# Patient Record
Sex: FEMALE | Race: WHITE | NOT HISPANIC OR LATINO | Employment: STUDENT | URBAN - METROPOLITAN AREA
[De-identification: names, ages, dates, MRNs, and addresses within clinical notes are randomized per-mention and may not be internally consistent; named-entity substitution may affect disease eponyms.]

---

## 2018-06-22 ENCOUNTER — RECORDS - HEALTHEAST (OUTPATIENT)
Dept: LAB | Facility: CLINIC | Age: 18
End: 2018-06-22

## 2018-06-22 LAB
TSH SERPL DL<=0.005 MIU/L-ACNC: 0.95 UIU/ML (ref 0.3–5)
VIT B12 SERPL-MCNC: 558 PG/ML (ref 213–816)

## 2019-03-11 ENCOUNTER — RECORDS - HEALTHEAST (OUTPATIENT)
Dept: LAB | Facility: CLINIC | Age: 19
End: 2019-03-11

## 2019-03-11 LAB
ALBUMIN SERPL-MCNC: 4.4 G/DL (ref 3.5–5)
ALP SERPL-CCNC: 62 U/L (ref 45–120)
ALT SERPL W P-5'-P-CCNC: 18 U/L (ref 0–45)
ANION GAP SERPL CALCULATED.3IONS-SCNC: 13 MMOL/L (ref 5–18)
AST SERPL W P-5'-P-CCNC: 20 U/L (ref 0–40)
BILIRUB SERPL-MCNC: 0.4 MG/DL (ref 0–1)
BUN SERPL-MCNC: 11 MG/DL (ref 8–22)
C REACTIVE PROTEIN LHE: <0.1 MG/DL (ref 0–0.8)
CALCIUM SERPL-MCNC: 10 MG/DL (ref 8.5–10.5)
CHLORIDE BLD-SCNC: 106 MMOL/L (ref 98–107)
CO2 SERPL-SCNC: 21 MMOL/L (ref 22–31)
CREAT SERPL-MCNC: 0.82 MG/DL (ref 0.6–1.1)
ERYTHROCYTE [SEDIMENTATION RATE] IN BLOOD BY WESTERGREN METHOD: 5 MM/HR (ref 0–20)
GFR SERPL CREATININE-BSD FRML MDRD: >60 ML/MIN/1.73M2
GLUCOSE BLD-MCNC: 89 MG/DL (ref 70–125)
IRON SERPL-MCNC: 73 UG/DL (ref 42–175)
POTASSIUM BLD-SCNC: 4.2 MMOL/L (ref 3.5–5)
PROT SERPL-MCNC: 7.3 G/DL (ref 6–8)
SODIUM SERPL-SCNC: 140 MMOL/L (ref 136–145)
TSH SERPL DL<=0.005 MIU/L-ACNC: 0.83 UIU/ML (ref 0.3–5)
VIT B12 SERPL-MCNC: 607 PG/ML (ref 213–816)

## 2019-03-12 ENCOUNTER — RECORDS - HEALTHEAST (OUTPATIENT)
Dept: LAB | Facility: CLINIC | Age: 19
End: 2019-03-12

## 2019-03-13 LAB
O+P STL MICRO: NORMAL
SHIGA TOXIN 1: NEGATIVE
SHIGA TOXIN 2: NEGATIVE

## 2019-03-14 LAB
GLIADIN IGA SER-ACNC: 0.4 U/ML
GLIADIN IGG SER-ACNC: 1 U/ML
HEMOCCULT STL-MCNT: 0.3 MG HB/G
IGA SERPL-MCNC: 111 MG/DL (ref 65–400)
TTG IGA SER-ACNC: 0.3 U/ML
TTG IGG SER-ACNC: <0.6 U/ML

## 2019-03-15 LAB — BACTERIA SPEC CULT: NORMAL

## 2019-07-11 ENCOUNTER — RECORDS - HEALTHEAST (OUTPATIENT)
Dept: LAB | Facility: CLINIC | Age: 19
End: 2019-07-11

## 2019-07-15 LAB
GLIADIN IGA SER-ACNC: 0.6 U/ML
GLIADIN IGG SER-ACNC: 1 U/ML
IGA SERPL-MCNC: 107 MG/DL (ref 65–400)
TTG IGA SER-ACNC: 0.6 U/ML
TTG IGG SER-ACNC: 3.6 U/ML

## 2020-12-21 ENCOUNTER — OFFICE VISIT - HEALTHEAST (OUTPATIENT)
Dept: FAMILY MEDICINE | Facility: CLINIC | Age: 20
End: 2020-12-21

## 2020-12-21 DIAGNOSIS — H60.502 ACUTE OTITIS EXTERNA OF LEFT EAR, UNSPECIFIED TYPE: ICD-10-CM

## 2021-06-05 VITALS
WEIGHT: 162 LBS | HEART RATE: 44 BPM | OXYGEN SATURATION: 98 % | DIASTOLIC BLOOD PRESSURE: 72 MMHG | TEMPERATURE: 98.7 F | SYSTOLIC BLOOD PRESSURE: 111 MMHG

## 2021-06-13 NOTE — PROGRESS NOTES
Assessment & Plan:       1. Acute otitis externa of left ear, unspecified type  neomycin-polymyxin-hydrocortisone (CORTISPORIN) otic solution      Medical Decision Making  Patient presents with acute onset left ear pain.  Physical exam shows signs consistent with acute otitis externa of the left ear.  Swelling appears mild to moderate and patient has no history of swimming exposure.  Will treat with Cortisporin otic solution.  Discussed proper ear care, and avoidance of cotton tip swabs.  Discussed signs of worsening symptoms and when to follow-up with PCP if no symptom improvement.    Patient Instructions   You were seen today for an infection of the outer ear, also called otitis externa.    Treatment:  - Use antibiotic drops as prescribed until completion, even if symptoms improve  - May use tylenol or ibuprofen for pain and discomfort  - Should notice symptom improvement in the next 72 hours  - Avoid swimming or getting fluid in the infected ear until 24 hours after symptoms resolve    When to return for re-evaluation?  - If symptoms have not begun improving in 72 hours  - Develop a fever of 100.4F or current fever worsens  - Becomes short of breath  - Neck stiffness  - Difficulty swallowing   - Worsening ear pain despite treatment  - Spreading redness, swelling, and tenderness          Subjective:       Brenna Jimenez is a 19 y.o. female here for evaluation of left ear pain.  Onset of symptoms was 3 to 4 days ago.  Patient denies fevers, sore throat, cough, ear drainage, and recent swimming exposure.  She does not have history of frequent ear infections.  Patient does use cotton-tipped swabs for ear cleaning on a daily basis.    The following portions of the patient's history were reviewed and updated as appropriate: allergies, current medications and problem list.    Review of Systems  Pertinent items are noted in HPI.     Allergies  No Known Allergies    No family history on file.    Social History      Socioeconomic History     Marital status: Single     Spouse name: None     Number of children: None     Years of education: None     Highest education level: None   Occupational History     None   Social Needs     Financial resource strain: None     Food insecurity     Worry: None     Inability: None     Transportation needs     Medical: None     Non-medical: None   Tobacco Use     Smoking status: Never Smoker     Smokeless tobacco: Never Used   Substance and Sexual Activity     Alcohol use: None     Drug use: None     Sexual activity: None   Lifestyle     Physical activity     Days per week: None     Minutes per session: None     Stress: None   Relationships     Social connections     Talks on phone: None     Gets together: None     Attends Hindu service: None     Active member of club or organization: None     Attends meetings of clubs or organizations: None     Relationship status: None     Intimate partner violence     Fear of current or ex partner: None     Emotionally abused: None     Physically abused: None     Forced sexual activity: None   Other Topics Concern     None   Social History Narrative     None         Objective:       /72   Pulse (!) 44   Temp 98.7  F (37.1  C)   Wt 162 lb (73.5 kg)   SpO2 98%   General appearance: alert, appears stated age, cooperative, no distress and non-toxic  Head: Normocephalic, without obvious abnormality, atraumatic  Ears: Left: TM intact with moderate serous fluid, no bulging; external ear canal with erythema and mild swelling, tenderness to tragus and auricle manipulation.  Right: TM intact with moderate serous fluid, no bulging or erythema, external ear normal  Throat: lips, mucosa, and tongue normal; teeth and gums normal  Neck: mild anterior cervical adenopathy and supple, symmetrical, trachea midline

## 2022-06-07 ENCOUNTER — OFFICE VISIT (OUTPATIENT)
Dept: FAMILY MEDICINE | Facility: CLINIC | Age: 22
End: 2022-06-07
Payer: COMMERCIAL

## 2022-06-07 VITALS
SYSTOLIC BLOOD PRESSURE: 110 MMHG | HEART RATE: 62 BPM | WEIGHT: 170 LBS | BODY MASS INDEX: 25.18 KG/M2 | DIASTOLIC BLOOD PRESSURE: 70 MMHG | OXYGEN SATURATION: 100 % | HEIGHT: 69 IN

## 2022-06-07 DIAGNOSIS — Z23 ENCOUNTER FOR IMMUNIZATION: ICD-10-CM

## 2022-06-07 DIAGNOSIS — Z11.1 SCREENING EXAMINATION FOR PULMONARY TUBERCULOSIS: ICD-10-CM

## 2022-06-07 DIAGNOSIS — Z00.00 ROUTINE PHYSICAL EXAMINATION: Primary | ICD-10-CM

## 2022-06-07 PROCEDURE — 86580 TB INTRADERMAL TEST: CPT | Performed by: FAMILY MEDICINE

## 2022-06-07 PROCEDURE — 99395 PREV VISIT EST AGE 18-39: CPT | Mod: 25 | Performed by: FAMILY MEDICINE

## 2022-06-07 PROCEDURE — 90471 IMMUNIZATION ADMIN: CPT | Performed by: FAMILY MEDICINE

## 2022-06-07 PROCEDURE — 90715 TDAP VACCINE 7 YRS/> IM: CPT | Performed by: FAMILY MEDICINE

## 2022-06-07 ASSESSMENT — ENCOUNTER SYMPTOMS
HEADACHES: 0
FEVER: 0
EYE PAIN: 0
PALPITATIONS: 0
HEMATOCHEZIA: 0
SHORTNESS OF BREATH: 0
COUGH: 0
FREQUENCY: 0
JOINT SWELLING: 0
ABDOMINAL PAIN: 0
PARESTHESIAS: 0
NERVOUS/ANXIOUS: 0
DIARRHEA: 0
WEAKNESS: 0
NAUSEA: 0
MYALGIAS: 0
ARTHRALGIAS: 0
DYSURIA: 0
CHILLS: 0
CONSTIPATION: 0
SORE THROAT: 0
HEARTBURN: 0
DIZZINESS: 0
HEMATURIA: 0

## 2022-06-07 ASSESSMENT — PAIN SCALES - GENERAL: PAINLEVEL: NO PAIN (0)

## 2022-06-07 NOTE — PROGRESS NOTES
Assessment/Plan:     Routine physical examination  Routine healthcare maintenance.  Preventative cares reviewed.  Patient declines Pap smear currently.  Sexually active.  Denies concerns currently.  Did encourage patient to schedule for female physical at earliest convenience in order to complete.  Patient declines routine GC chlamydia screen.  Patient declines routine hepatitis C or HIV screen.  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    Encounter for immunization  Adacel booster provided today.  Remainder of immunization records to be obtained through pediatrician and [facility]  - TDAP VACCINE (Adacel, Boostrix)    Screening examination for pulmonary tuberculosis  Mantle testing to be completed.  - TB INTRADERMAL TEST         1. Annual Physical Exam.  Encouraged healthy lifestyle habits including regular exercise, healthy eating habits, and adequate calcium and vitamin D intake.           Subjective:     Brenna Jimenez is a 21 year old female who presents for an annual exam.  In general doing well.  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  Past medical social and family history reviewed and updated as noted below.  History of being sexually active.  No prior Pap smear completion.  Patient uncertain of immunization records.  Had initial Sourav [employer] Sourav COVID-19 vaccine followed by booster immunization in December 2021 noted.  Patient avoids meat and dairy otherwise no food restrictions or allergy described.  Younger sibling with type 1 diabetes.    Single   No children   Tobacco:  none   EtOH:  occ   Mom -   Dad - HTN?   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  Lipid Profile: No  Glucose Screen: No  Last Mammogram: N/A  Colonoscopy: N/A  Last Dexa: N/A    Immunization History   Administered Date(s) Administered     DTAP (<7y) 06/22/2004     HPV Quadrivalent 05/20/2010, 11/17/2010, 07/26/2011     HepA-ped 2 Dose 03/29/2007, 08/24/2009     MMR 06/22/2004     Meningococcal (Menactra ) 05/20/2010     Meningococcal (Menveo ) 02/17/2017     Poliovirus, inactivated (IPV) 06/22/2004     Tdap (Adacel,Boostrix) 05/20/2010     Varicella 03/29/2007     Immunization status: needs Tdap otherwise describes UTD.      Gynecologic History  Patient's last menstrual period was 06/02/2022 (exact date).  Sexually active: Yes  Last Pap:  None prior and will need to schedule in future (patient declines currently)      OB History   No obstetric history on file.       No current outpatient medications on file.     No past medical history on file.  No past surgical history on file.  Patient has no known allergies.  No family history on file.  Social History     Socioeconomic History     Marital status: Single     Spouse name: Not on file     Number of children: Not on file     Years of education: Not on file     Highest education level: Not on file   Occupational History     Not on file   Tobacco Use     Smoking status: Never Smoker     Smokeless tobacco: Never Used   Substance and Sexual Activity     Alcohol use: Not on file     Drug use: Not on file     Sexual activity: Not on file   Other Topics Concern     Not on file   Social History Narrative     Not on file     Social Determinants of Health     Financial Resource Strain: Not on file   Food Insecurity: Not on file   Transportation Needs: Not on file   Physical Activity: Not on file  "  Stress: Not on file   Social Connections: Not on file   Intimate Partner Violence: Not on file   Housing Stability: Not on file       Review of Systems  General:  Denies problem  Eyes: Denies problem  Ears/Nose/Throat: Denies problem  Cardiovascular: Denies problem  Respiratory:  Denies problem  Gastrointestinal:  Denies problem, Genitourinary: Denies problem  Musculoskeletal:  Denies problem  Skin: Denies problem  Neurologic: Denies problem  Psychiatric: Denies problem  Endocrine: Denies problem  Heme/Lymphatic: Denies problem   Allergic/Immunologic: Denies problem       [unfilled]    Objective:        Vitals:    06/07/22 0709   BP: 110/70   Pulse: 62   SpO2: 100%   Weight: 77.1 kg (170 lb)   Height: 1.753 m (5' 9\")   PainSc: No Pain (0)     Body mass index is 25.1 kg/m .    Physical Exam:    General Appearance: Alert, pleasant, appears stated age  Head: Normocephalic, without obvious abnormality  Eyes: PERRL, conjunctiva/corneas clear, EOM's intact  Ears: Normal TM's and external ear canals, both ears  Nose: Nares normal, septum midline,mucosa normal, no drainage  Throat: Lips, mucosa, and tongue normal; teeth and gums normal; oropharynx is clear  Neck: Supple,without lymphadenopathy or thyromegally  Lungs: Clear to auscultation bilaterally, respirations unlabored  Breasts: deferred  Heart: Regular rate and rhythm, no murmur   Abdomen: Soft, non-tender, no masses, no organomegaly  Pelvic:deferred  Extremities: Extremities with strong and symmetric pulses, no cyanosis or edema  Skin: Skin color, texture normal, no rashes or lesions  Neurologic: Normal            This note has been dictated using voice recognition software and as a result may contain minor grammatical errors and unintended word substitutions. Answers for HPI/ROS submitted by the patient on 6/7/2022  Frequency of exercise:: 6-7 days/week  Getting at least 3 servings of Calcium per day:: Yes  Diet:: Vegetarian/vegan  Taking medications " regularly:: Yes  Medication side effects:: None  Bi-annual eye exam:: NO  Dental care twice a year:: Yes  Sleep apnea or symptoms of sleep apnea:: None  abdominal pain: No  Blood in stool: No  Blood in urine: No  chest pain: No  chills: No  congestion: No  constipation: No  cough: No  diarrhea: No  dizziness: No  ear pain: No  eye pain: No  nervous/anxious: No  fever: No  frequency: No  genital sores: No  headaches: No  hearing loss: No  heartburn: No  arthralgias: No  joint swelling: No  peripheral edema: No  mood changes: No  myalgias: No  nausea: No  dysuria: No  palpitations: No  Skin sensation changes: No  sore throat: No  urgency: No  rash: No  shortness of breath: No  visual disturbance: No  weakness: No  Additional concerns today:: No  Duration of exercise:: 30-45 minutes

## 2022-09-25 ENCOUNTER — HEALTH MAINTENANCE LETTER (OUTPATIENT)
Age: 22
End: 2022-09-25

## 2023-08-06 ENCOUNTER — HEALTH MAINTENANCE LETTER (OUTPATIENT)
Age: 23
End: 2023-08-06

## 2023-09-06 ENCOUNTER — OFFICE VISIT (OUTPATIENT)
Dept: FAMILY MEDICINE | Facility: CLINIC | Age: 23
End: 2023-09-06
Payer: COMMERCIAL

## 2023-09-06 VITALS
DIASTOLIC BLOOD PRESSURE: 77 MMHG | SYSTOLIC BLOOD PRESSURE: 120 MMHG | TEMPERATURE: 97.7 F | HEIGHT: 69 IN | RESPIRATION RATE: 16 BRPM | HEART RATE: 49 BPM | BODY MASS INDEX: 25.33 KG/M2 | OXYGEN SATURATION: 100 % | WEIGHT: 171 LBS

## 2023-09-06 DIAGNOSIS — Z02.1 PHYSICAL EXAM, PRE-EMPLOYMENT: Primary | ICD-10-CM

## 2023-09-06 PROCEDURE — 36415 COLL VENOUS BLD VENIPUNCTURE: CPT | Performed by: FAMILY MEDICINE

## 2023-09-06 PROCEDURE — 86787 VARICELLA-ZOSTER ANTIBODY: CPT | Performed by: FAMILY MEDICINE

## 2023-09-06 PROCEDURE — 86765 RUBEOLA ANTIBODY: CPT | Performed by: FAMILY MEDICINE

## 2023-09-06 PROCEDURE — 86706 HEP B SURFACE ANTIBODY: CPT | Performed by: FAMILY MEDICINE

## 2023-09-06 PROCEDURE — 86481 TB AG RESPONSE T-CELL SUSP: CPT | Performed by: FAMILY MEDICINE

## 2023-09-06 PROCEDURE — 86762 RUBELLA ANTIBODY: CPT | Performed by: FAMILY MEDICINE

## 2023-09-06 PROCEDURE — 99213 OFFICE O/P EST LOW 20 MIN: CPT | Performed by: FAMILY MEDICINE

## 2023-09-06 PROCEDURE — 86735 MUMPS ANTIBODY: CPT | Mod: 59 | Performed by: FAMILY MEDICINE

## 2023-09-06 ASSESSMENT — ENCOUNTER SYMPTOMS
CONSTIPATION: 0
SHORTNESS OF BREATH: 0
COUGH: 0
JOINT SWELLING: 0
DIARRHEA: 0
ARTHRALGIAS: 0
MYALGIAS: 0
PARESTHESIAS: 0
WEAKNESS: 0
NERVOUS/ANXIOUS: 0
FEVER: 0
HEMATURIA: 0
SORE THROAT: 0
ABDOMINAL PAIN: 0
HEARTBURN: 0
CHILLS: 0
NAUSEA: 0
HEMATOCHEZIA: 0
PALPITATIONS: 0
HEADACHES: 0
FREQUENCY: 0
EYE PAIN: 0
DIZZINESS: 0
DYSURIA: 0
BREAST MASS: 0

## 2023-09-06 NOTE — PROGRESS NOTES
SUBJECTIVE:   CC: Yenifer is an 22 year old female here for evaluation of prior immunizations and TB testing for her current work. 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 She says she forwarded her immunizations records several times but are still asking for surveillance of MMR, varicella, hepatitis B, needs TB testing. She has not collected any blood work lately. She does not want to discuss her health maintenance or collect pap smear today.       9/6/2023     1:01 PM   Additional Questions   Roomed by Tanja       Healthy Habits:     Getting at least 3 servings of Calcium per day:  NO    Bi-annual eye exam:  NO    Dental care twice a year:  Yes    Sleep apnea or symptoms of sleep apnea:  None    Diet:  Vegetarian/vegan    Frequency of exercise:  4-5 days/week    Duration of exercise:  45-60 minutes    Taking medications regularly:  Not Applicable    Medication side effects:  Not applicable    Additional concerns today:  No      Today's PHQ-2 Score:       9/6/2023    12:46 PM   PHQ-2 ( 1999 [employer])   Q1: Little interest or pleasure in doing things 0   Q2: Feeling down, depressed or hopeless 0   PHQ-2 Score 0   Q1: Little interest or pleasure in doing things Not at all   Q2: Feeling down, depressed or hopeless Not at all   PHQ-2 Score 0       Have you ever done Advance Care Planning? (For example, a Health Directive, POLST, or a discussion with a medical provider or your loved ones about your wishes): No, advance care planning information given to patient to review.  Patient declined advance care planning discussion at this time.    Social History     Tobacco Use    Smoking status: Never    Smokeless tobacco: Never   Substance Use Topics    Alcohol use: Not on file             9/6/2023    12:46 PM   Alcohol Use   Prescreen: >3 drinks/day or >7 drinks/week? No     Reviewed orders with patient.  Reviewed health maintenance and updated orders accordingly - Yes  Lab work is in process    Breast  "Cancer Screening:        History of abnormal Pap smear: NO - age 21-29 PAP every 3 years recommended     Reviewed and updated as needed this visit by clinical staff   Tobacco  Allergies  Meds              Reviewed and updated as needed this visit by Provider                 No past medical history on file.   Past Surgical History:   Procedure Laterality Date    APPENDECTOMY       OB History   No obstetric history on file.     Family History   Problem Relation Age of Onset    No Known Problems Mother     Hypertension Father     Diabetes Brother        Review of Systems   Constitutional:  Negative for chills and fever.   HENT:  Negative for congestion, ear pain, hearing loss and sore throat.    Eyes:  Negative for pain and visual disturbance.   Respiratory:  Negative for cough and shortness of breath.    Cardiovascular:  Negative for chest pain, palpitations and peripheral edema.   Gastrointestinal:  Negative for abdominal pain, constipation, diarrhea, heartburn, hematochezia and nausea.   Breasts:  Negative for tenderness, breast mass and discharge.   Genitourinary:  Negative for dysuria, frequency, genital sores, hematuria, pelvic pain, urgency, vaginal bleeding and vaginal discharge.   Musculoskeletal:  Negative for arthralgias, joint swelling and myalgias.   Skin:  Negative for rash.   Neurological:  Negative for dizziness, weakness, headaches and paresthesias.   Psychiatric/Behavioral:  Negative for mood changes. The patient is not nervous/anxious.           OBJECTIVE:   /77 (BP Location: Left arm, Patient Position: Sitting, Cuff Size: Adult Regular)   Pulse (!) 49   Temp 97.7  F (36.5  C) (Temporal)   Resp 16   Ht 1.76 m (5' 9.29\")   Wt 77.6 kg (171 lb)   LMP 08/31/2023 (Approximate)   SpO2 100%   BMI 25.04 kg/m    Physical Exam  GENERAL: healthy, alert and no distress  NECK: no adenopathy, no asymmetry, masses, or scars and thyroid normal to palpation  RESP: lungs clear to auscultation - no " rales, rhonchi or wheezes  CV: regular rate and rhythm, normal S1 S2, no S3 or S4, no murmur, click or rub, no peripheral edema and peripheral pulses strong  MS: no gross musculoskeletal defects noted, no edema  NEURO: Normal strength and tone, mentation intact and speech normal  PSYCH: mentation appears normal, affect normal/bright    Diagnostic Test Results:  Labs reviewed in Epic  Results for orders placed or performed in visit on 09/06/23   Varicella Zoster Virus Antibody IgG     Status: None   Result Value Ref Range    VZV Suzan IgG Instrument Value 1,325.0 <135.0 Index    Varicella Zoster Antibody IgG Positive    Hepatitis B Surface Antibody     Status: None   Result Value Ref Range    Hepatitis B Surface Antibody Instrument Value 1.46 <8.00 m[IU]/mL    Hepatitis B Surface Antibody Nonreactive    Mumps Immune Status, IgG     Status: None   Result Value Ref Range    Mumps Suzan IgG Instrument Value 40.0 <9.0 AU/mL    Mumps Antibody IgG Positive    Rubeola Antibody IgG     Status: None   Result Value Ref Range    Rubeola (Measles) Suzan IgG Instrument Value 224.0 <13.5 AU/mL    Rubeola (Measles) Antibody IgG Positive    Rubella Antibody IgG     Status: None   Result Value Ref Range    Rubella Suzan IgG Instrument Value 4.13 <0.90 Index    Rubella Antibody IgG Positive    Quantiferon TB Gold Plus Grey Tube     Status: None    Specimen: Arm, Left; Blood   Result Value Ref Range    Quantiferon Nil Tube 0.06 IU/mL   Quantiferon TB Gold Plus Green Tube     Status: None    Specimen: Arm, Left; Blood   Result Value Ref Range    Quantiferon TB1 Tube 0.07 IU/mL   Quantiferon TB Gold Plus Yellow Tube     Status: None    Specimen: Arm, Left; Blood   Result Value Ref Range    Quantiferon TB2 Tube 0.07    Quantiferon TB Gold Plus Purple Tube     Status: None    Specimen: Arm, Left; Blood   Result Value Ref Range    Quantiferon Mitogen 10.00 IU/mL   Quantiferon TB Gold Plus     Status: None    Specimen: Arm, Left; Blood   Result  Value Ref Range    Quantiferon-TB Gold Plus Negative Negative    TB1 Ag minus Nil Value 0.01 IU/mL    TB2 Ag minus Nil Value 0.01 IU/mL    Mitogen minus Nil Result 9.94 IU/mL    Nil Result 0.06 IU/mL   Quantiferon TB Gold Plus     Status: None    Specimen: Arm, Left; Blood    Narrative    The following orders were created for panel order Quantiferon TB Gold Plus.  Procedure                               Abnormality         Status                     ---------                               -----------         ------                     Quantiferon TB Gold Plus[[id number]]                         Final result               Quantiferon TB Gold Plus...[[id number]]                      Final result               Quantiferon TB Gold Plus...[[id number]]                      Final result               Quantiferon TB Gold Plus...[[id number]]                      Final result               Quantiferon TB Gold Plus...[[id number]]                      Final result                 Please view results for these tests on the individual orders.       ASSESSMENT/PLAN:   (Z02.1) Physical exam, pre-employment  (primary encounter diagnosis)  Plan: Quantiferon TB Gold Plus, Varicella Zoster         Virus Antibody IgG, Hepatitis B Surface         Antibody, Mumps Immune Status, IgG, Rubeola         Antibody IgG, Rubella Antibody IgG, CANCELED:         Rubella Suzan IgG ([employer])    EHR reviewed.   Past medical history, problem list, past surgical history, family history, social history, medications reviewed, updated, reconciled.   Patient is here for employment physical and does not want to be evaluated for health maintenance.   Her immunization record was reviewed.   Titers collected as requested by her work. Needs TB screening. We discussed possible need for immunizations if her blood work does not reveal immunity.   Will follow blood work.   Encourage to consider annual health maintenance visit.   Declines immunizations today.     BMI:  "  Estimated body mass index is 25.04 kg/m  as calculated from the following:    Height as of this encounter: 1.76 m (5' 9.29\").    Weight as of this encounter: 77.6 kg (171 lb).       She reports that she has never smoked. She has never used smokeless tobacco.            Prior to immunization administration, verified patients identity using patient s name and date of birth. Please see Immunization Activity for additional information.     Screening Questionnaire for Adult Immunization    Are you sick today?   No   Do you have allergies to medications, food, a vaccine component or latex?   Yes   Have you ever had a serious reaction after receiving a vaccination?   No   Do you have a long-term health problem with heart, lung, kidney, or metabolic disease (e.g., diabetes), asthma, a blood disorder, no spleen, complement component deficiency, a cochlear implant, or a spinal fluid leak?  Are you on long-term aspirin therapy?   No   Do you have cancer, leukemia, HIV/AIDS, or any other immune system problem?   No   Do you have a parent, brother, or sister with an immune system problem?   Yes   In the past 3 months, have you taken medications that affect  your immune system, such as prednisone, other steroids, or anticancer drugs; drugs for the treatment of rheumatoid arthritis, Crohn s disease, or psoriasis; or have you had radiation treatments?   No   Have you had a seizure, or a brain or other nervous system problem?   No   During the past year, have you received a transfusion of blood or blood    products, or been given immune (gamma) globulin or antiviral drug?   No   For women: Are you pregnant or is there a chance you could become       pregnant during the next month?   No   Have you received any vaccinations in the past 4 weeks?   No     Immunization questionnaire was positive for at least one answer.  Notified .      Patient instructed to remain in clinic for 15 minutes afterwards, and to report any adverse " reactions.     Screening performed by Tanja Denney CMA on 9/6/2023 at 1:05 PM.        Belia Gilbert MD  [facility]

## 2023-09-07 LAB
HBV SURFACE AB SERPL IA-ACNC: 1.46 M[IU]/ML
HBV SURFACE AB SERPL IA-ACNC: NONREACTIVE M[IU]/ML
MEV IGG SER IA-ACNC: 224 AU/ML
MEV IGG SER IA-ACNC: POSITIVE
MUMPS ANTIBODY IGG INSTRUMENT VALUE: 40 AU/ML
MUV IGG SER QL IA: POSITIVE
RUBV IGG SERPL QL IA: 4.13 INDEX
RUBV IGG SERPL QL IA: POSITIVE
VZV IGG SER QL IA: 1325 INDEX
VZV IGG SER QL IA: POSITIVE

## 2023-09-08 LAB
GAMMA INTERFERON BACKGROUND BLD IA-ACNC: 0.06 IU/ML
M TB IFN-G BLD-IMP: NEGATIVE
M TB IFN-G CD4+ BCKGRND COR BLD-ACNC: 9.94 IU/ML
MITOGEN IGNF BCKGRD COR BLD-ACNC: 0.01 IU/ML
MITOGEN IGNF BCKGRD COR BLD-ACNC: 0.01 IU/ML
QUANTIFERON MITOGEN: 10 IU/ML
QUANTIFERON NIL TUBE: 0.06 IU/ML
QUANTIFERON TB1 TUBE: 0.07 IU/ML
QUANTIFERON TB2 TUBE: 0.07

## 2023-09-14 ENCOUNTER — TELEPHONE (OUTPATIENT)
Dept: FAMILY MEDICINE | Facility: CLINIC | Age: 23
End: 2023-09-14
Payer: COMMERCIAL

## 2023-09-14 NOTE — TELEPHONE ENCOUNTER
----- Message from Belia Gilbert MD sent at 9/14/2023  2:49 PM CDT -----  Please call. TB negative. Immune to varicella and MMR. Needs Hep B series. Can start where ever she likes. Can send results where she likes.

## 2023-09-15 ENCOUNTER — TELEPHONE (OUTPATIENT)
Dept: FAMILY MEDICINE | Facility: CLINIC | Age: 23
End: 2023-09-15
Payer: COMMERCIAL

## 2024-09-28 ENCOUNTER — HEALTH MAINTENANCE LETTER (OUTPATIENT)
Age: 24
End: 2024-09-28